# Patient Record
Sex: MALE | Race: WHITE | URBAN - METROPOLITAN AREA
[De-identification: names, ages, dates, MRNs, and addresses within clinical notes are randomized per-mention and may not be internally consistent; named-entity substitution may affect disease eponyms.]

---
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
## 2024-01-03 ENCOUNTER — RESULT CHARGE (OUTPATIENT)
Age: 19
End: 2024-01-03

## 2024-01-03 ENCOUNTER — APPOINTMENT (OUTPATIENT)
Dept: ORTHOPEDIC SURGERY | Facility: CLINIC | Age: 19
End: 2024-01-03
Payer: MEDICAID

## 2024-01-03 DIAGNOSIS — S62.141A DISPLACED FRACTURE OF BODY OF HAMATE [UNCIFORM] BONE, RIGHT WRIST, INITIAL ENCOUNTER FOR CLOSED FRACTURE: ICD-10-CM

## 2024-01-03 PROCEDURE — 99213 OFFICE O/P EST LOW 20 MIN: CPT

## 2024-01-03 PROCEDURE — 73130 X-RAY EXAM OF HAND: CPT | Mod: RT

## 2024-01-04 VITALS — BODY MASS INDEX: 27.83 KG/M2 | WEIGHT: 210 LBS | HEIGHT: 73 IN

## 2024-01-04 NOTE — HISTORY OF PRESENT ILLNESS
[de-identified] : Patient is a 89-year-old male here for repeat evaluation of his right hand.  He is about 8 months status post a CRPP of the right fifth CMC joint after fracture dislocation.  He has been having some continued pain and notices a clicking sound.  Some days are worse than others.  He feels that it never fully resolved after surgery.

## 2024-01-04 NOTE — DATA REVIEWED
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

## 2024-01-04 NOTE — PHYSICAL EXAM
[de-identified] : Physical exam of his right hand: Negative swelling or ecchymosis.  He has well-healed surgical scar on the dorsal side of the hand.  Nontender over the distal wrist or distal ulna.  Negative anatomical snuffbox tenderness.  Mild tenderness over the scar and fifth CMC joint.  He can make a full fist.   strength is 5.  Sensory and motor are intact.

## 2024-01-04 NOTE — DISCUSSION/SUMMARY
[de-identified] : I discussed the case with Dr. Coughlin.  I wrote him a prescription for some physical therapy. He will follow-up in a month with Dr. Coughlin for repeat evaluation.  He understands that he will likely have some discomfort intermittently in the future as well.  All questions were answered today.

## 2024-01-29 NOTE — ED PROCEDURE NOTE - CPROC ED POST PROC CARE GUIDE1
Instructed patient/caregiver to follow-up with primary care physician./Verbal/written post procedure instructions were given to patient/caregiver./Keep the cast/splint/dressing clean and dry./Instructed patient/caregiver regarding signs and symptoms of infection.
chest pain and headache/Left:

## 2024-02-01 ENCOUNTER — APPOINTMENT (OUTPATIENT)
Dept: ORTHOPEDIC SURGERY | Facility: CLINIC | Age: 19
End: 2024-02-01

## 2024-03-15 NOTE — ED PEDIATRIC TRIAGE NOTE - MODE OF ARRIVAL
Lab Results   Component Value Date    EGFR 8 03/15/2024    EGFR 10 03/14/2024    EGFR 11 03/14/2024    CREATININE 7.75 (H) 03/15/2024    CREATININE 6.53 (H) 03/14/2024    CREATININE 5.92 (H) 03/14/2024   Continue conservative management   Walk in

## 2024-06-14 ENCOUNTER — EMERGENCY (EMERGENCY)
Facility: HOSPITAL | Age: 19
LOS: 0 days | Discharge: HOME | End: 2024-06-14
Attending: EMERGENCY MEDICINE
Payer: MEDICAID

## 2024-06-14 VITALS
SYSTOLIC BLOOD PRESSURE: 138 MMHG | DIASTOLIC BLOOD PRESSURE: 76 MMHG | HEART RATE: 98 BPM | OXYGEN SATURATION: 98 % | TEMPERATURE: 98 F | RESPIRATION RATE: 18 BRPM | HEIGHT: 74 IN | WEIGHT: 182.1 LBS

## 2024-06-14 DIAGNOSIS — M79.674 PAIN IN RIGHT TOE(S): ICD-10-CM
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
## 2024-06-14 PROCEDURE — 73630 X-RAY EXAM OF FOOT: CPT | Mod: RT

## 2024-06-14 PROCEDURE — 73630 X-RAY EXAM OF FOOT: CPT | Mod: 26,RT

## 2024-06-14 PROCEDURE — 99283 EMERGENCY DEPT VISIT LOW MDM: CPT | Mod: 25

## 2024-06-14 PROCEDURE — 99284 EMERGENCY DEPT VISIT MOD MDM: CPT

## 2024-06-14 NOTE — ED ADULT TRIAGE NOTE - CHIEF COMPLAINT QUOTE
Patient c/o pain and bruising to right foot first digit. Patient states he dropped 5lb weight 5 days ago on foot

## 2024-06-14 NOTE — ED PROVIDER NOTE - ATTENDING APP SHARED VISIT CONTRIBUTION OF CARE
19-year-old male, no past medical history, comes in complaining of right first toe pain which started 5 days ago when he dropped a 5 pound weight on it.  Patient states he is able to walk but continues to have pain prompting ED visit for x-ray.  No other complaints.  On exam, (-) deformity to toe, FROM of ankle/toes, (-) bruising, good cap refill. X-ray done–no fracture.  Will DC.

## 2024-06-14 NOTE — ED PROVIDER NOTE - PATIENT PORTAL LINK FT
You can access the FollowMyHealth Patient Portal offered by  by registering at the following website: [email or web address]. By joining [employer]’s FollowMyHealth portal, you will also be able to view your health information using other applications (apps) compatible with our system.

## 2024-06-14 NOTE — ED PROVIDER NOTE - MUSCULOSKELETAL, MLM
DISPLAY PLAN FREE TEXT DISPLAY PLAN FREE TEXT DISPLAY PLAN FREE TEXT DISPLAY PLAN FREE TEXT 3 right  over 1st MTP, FROM, no swelling, no bruising, NV intact

## 2024-06-14 NOTE — ED ADULT NURSE NOTE - FINAL NURSING ELECTRONIC SIGNATURE
Bedside shift change report given to Mark Sales (oncoming nurse) by Crystal De La Torre (offgoing nurse). Report included the following information SBAR, Kardex, MAR and Recent Results. 14-Jun-2024 14:02

## 2024-06-14 NOTE — ED PROVIDER NOTE - CLINICAL SUMMARY MEDICAL DECISION MAKING FREE TEXT BOX
19-year-old male, no past medical history, comes in complaining of right first toe pain which started 5 days ago when he dropped a 5 pound weight on it.  Patient states he is able to walk but continues to have pain prompting ED visit for x-ray.  No other complaints.  On exam, (-) deformity to toe, FROM of ankle/toes, (-) bruising, good cap refill. X-ray done–no fracture.  Will DC. none

## 2024-06-14 NOTE — ED ADULT NURSE NOTE - NSFALLUNIVINTERV_ED_ALL_ED
Bed/Stretcher in lowest position, wheels locked, appropriate side rails in place/Call bell, personal items and telephone in reach/Instruct patient to call for assistance before getting out of bed/chair/stretcher/Non-slip footwear applied when patient is off stretcher/[place] to call system/Physically safe environment - no spills, clutter or unnecessary equipment/Purposeful proactive rounding/Room/bathroom lighting operational, light cord in reach

## 2024-11-01 ENCOUNTER — APPOINTMENT (OUTPATIENT)
Dept: ORTHOPEDIC SURGERY | Facility: CLINIC | Age: 19
End: 2024-11-01
Payer: MEDICAID

## 2024-11-01 DIAGNOSIS — S63.054D DISLOCATION OF OTHER CARPOMETACARPAL JOINT OF RIGHT HAND, SUBSEQUENT ENCOUNTER: ICD-10-CM

## 2024-11-01 PROCEDURE — 99212 OFFICE O/P EST SF 10 MIN: CPT | Mod: 25

## 2024-11-01 PROCEDURE — 73130 X-RAY EXAM OF HAND: CPT | Mod: RT

## 2025-02-20 NOTE — ED PROVIDER NOTE - NS ED ATTENDING STATEMENT MOD
negative
This was a shared visit with the STEPHANIE. I reviewed and verified the documentation and independently performed the documented:

## 2025-03-25 ENCOUNTER — OUTPATIENT (OUTPATIENT)
Dept: OUTPATIENT SERVICES | Facility: HOSPITAL | Age: 20
LOS: 1 days | End: 2025-03-25

## 2025-03-25 DIAGNOSIS — K02.9 DENTAL CARIES, UNSPECIFIED: ICD-10-CM

## 2025-06-12 NOTE — ED PROVIDER NOTE - CONDITION AT DISCHARGE:
History of anemia secondary to CKD stage IV  Hemoglobin 8.3 on admission  Hemoglobin 1 month ago was 14.0, however suspect that this was erroneous lab our as patient's baseline as of a year ago was in the tens  Fecal occult stool in the ED was negative  Hold home iron supplementation  B12, WNL  folate WNL   iron panel low will initiate venofer  Continue Eliquis for now as there are no signs of active bleeding and suspect that this is around patient's baseline   Improved